# Patient Record
Sex: FEMALE | Race: ASIAN | NOT HISPANIC OR LATINO
[De-identification: names, ages, dates, MRNs, and addresses within clinical notes are randomized per-mention and may not be internally consistent; named-entity substitution may affect disease eponyms.]

---

## 2017-01-31 ENCOUNTER — APPOINTMENT (OUTPATIENT)
Dept: UROGYNECOLOGY | Facility: CLINIC | Age: 72
End: 2017-01-31

## 2017-02-01 RX ORDER — SOLIFENACIN SUCCINATE 5 MG/1
5 TABLET, FILM COATED ORAL
Refills: 0 | Status: ACTIVE | COMMUNITY
Start: 2017-02-01

## 2017-04-10 ENCOUNTER — RESULT REVIEW (OUTPATIENT)
Age: 72
End: 2017-04-10

## 2017-11-21 ENCOUNTER — APPOINTMENT (OUTPATIENT)
Dept: UROGYNECOLOGY | Facility: CLINIC | Age: 72
End: 2017-11-21
Payer: MEDICARE

## 2017-11-21 PROCEDURE — 99213 OFFICE O/P EST LOW 20 MIN: CPT

## 2018-09-10 ENCOUNTER — APPOINTMENT (OUTPATIENT)
Dept: UROGYNECOLOGY | Facility: CLINIC | Age: 73
End: 2018-09-10
Payer: MEDICARE

## 2018-09-10 DIAGNOSIS — N95.2 POSTMENOPAUSAL ATROPHIC VAGINITIS: ICD-10-CM

## 2018-09-10 PROCEDURE — A4562: CPT

## 2018-09-10 PROCEDURE — 99213 OFFICE O/P EST LOW 20 MIN: CPT

## 2020-02-04 PROBLEM — M54.9 BACK PAIN: Status: ACTIVE | Noted: 2020-02-04

## 2020-02-05 ENCOUNTER — APPOINTMENT (OUTPATIENT)
Dept: GYNECOLOGIC ONCOLOGY | Facility: CLINIC | Age: 75
End: 2020-02-05
Payer: MEDICARE

## 2020-02-05 VITALS
DIASTOLIC BLOOD PRESSURE: 86 MMHG | WEIGHT: 123 LBS | SYSTOLIC BLOOD PRESSURE: 136 MMHG | HEIGHT: 60 IN | HEART RATE: 77 BPM | BODY MASS INDEX: 24.15 KG/M2

## 2020-02-05 DIAGNOSIS — M54.9 DORSALGIA, UNSPECIFIED: ICD-10-CM

## 2020-02-05 PROCEDURE — 99205 OFFICE O/P NEW HI 60 MIN: CPT | Mod: 25

## 2020-02-05 PROCEDURE — 58100 BIOPSY OF UTERUS LINING: CPT

## 2020-02-13 LAB — CORE LAB BIOPSY: NORMAL

## 2020-03-03 ENCOUNTER — APPOINTMENT (OUTPATIENT)
Dept: UROGYNECOLOGY | Facility: CLINIC | Age: 75
End: 2020-03-03
Payer: MEDICARE

## 2020-03-03 VITALS
DIASTOLIC BLOOD PRESSURE: 68 MMHG | WEIGHT: 124 LBS | SYSTOLIC BLOOD PRESSURE: 104 MMHG | HEIGHT: 61 IN | BODY MASS INDEX: 23.41 KG/M2

## 2020-03-03 LAB
BILIRUB UR QL STRIP: NORMAL
CLARITY UR: CLEAR
COLLECTION METHOD: NORMAL
GLUCOSE UR-MCNC: NORMAL
HCG UR QL: 0.2 EU/DL
HGB UR QL STRIP.AUTO: NORMAL
KETONES UR-MCNC: NORMAL
LEUKOCYTE ESTERASE UR QL STRIP: NORMAL
NITRITE UR QL STRIP: NORMAL
PH UR STRIP: 6.5
PROT UR STRIP-MCNC: NORMAL
SP GR UR STRIP: 1

## 2020-03-03 PROCEDURE — 99215 OFFICE O/P EST HI 40 MIN: CPT | Mod: 25

## 2020-03-03 PROCEDURE — 81003 URINALYSIS AUTO W/O SCOPE: CPT | Mod: QW

## 2020-03-03 PROCEDURE — 51701 INSERT BLADDER CATHETER: CPT

## 2020-03-03 NOTE — LETTER BODY
[Dear  ___] : Dear ~ROBI, [Dear  ___] : Dear  [unfilled], [I had the pleasure of evaluating your patient, [unfilled]. Thank you for referring Ms. [unfilled] for consultation for ___] : I had the pleasure of evaluating your patient, [unfilled]. Thank you for referring Ms. [unfilled] for consultation for [unfilled]. [Thank you very much for allowing me to participate in the care of this patient. If you have any questions, please do not hesitate to contact me] : Thank you very much for allowing me to participate in the care of this patient. If you have any questions, please do not hesitate to contact me. [Attached please find my note.] : Attached please find my note.

## 2020-03-03 NOTE — DISCUSSION/SUMMARY
[FreeTextEntry1] : I reviewed the above findings with the patient and her . We reviewed surgical and nonsurgical treatment options for the prolapse and she is interested in surgical correction. We reviewed that surgery is not intended to treat her urinary complaints consistent with overactive bladder. We reviewed the abdominal birth the vaginal route for pelvic organ prolapse and that we will further discuss this after she leaves the pessary out for 10-14 days in order for me to assess the full extent of her prolapse. We discussed returning for urodynamic testing to further evaluate her urinary complaints. Multiple questions regarding treatment options were discussed. IUGA. Patient information on pelvic organ prolapse, stress incontinence, an overactive bladder was given to her. All questions were answered.

## 2020-03-03 NOTE — PROCEDURE
[FreeTextEntry1] : A catheterized urine was performed to rule out urinary tract infection and/or retention.\par

## 2020-03-03 NOTE — PHYSICAL EXAM
[Well developed] : well developed [No Acute Distress] : in no acute distress [Well Nourished] : ~L well nourished [Oriented x3] : oriented to person, place, and time [Soft, Nontender] : the abdomen was soft and nontender [Warm and Dry] : was warm and dry to touch [Vulvar Atrophy] : vulvar atrophy [Atrophy] : atrophy [Normal Appearance] : general appearance was normal [Normal] : normal [Uterine Prolapse] : uterine prolapse [Uterine Adnexae] : were not tender and not enlarged [Post Void Residual ____ml] : post void residual via catheterization was [unfilled] ml [Anxiety] : patient is not anxious [FreeTextEntry4] : ring pessary in place and removed [de-identified] : unable to fully asses due to pessary

## 2020-03-03 NOTE — HISTORY OF PRESENT ILLNESS
[Unable To Restrain Bowel Movement] : rare [] : months ago [Urinary Frequency] : none [Feelings Of Urinary Urgency] : daily [Urinary Tract Infection] : none [x2] : two times a night [de-identified] : pt has pessary for the past 5 years - self care every 8-10 days [de-identified] : not sexually active.  [FreeTextEntry1] : pt here with , chart reviewed. with a history of pelvic organ prolapse had an endometrial biopsy in February, which revealed atrophic endometrial tissue. She had a MRI in August, which revealed uterus 4.5 x 2.4 x 4.5 cm and an endometrial lining of 5 mm.\par pt has had a pessary for the past 5 years. pt interested in surgical correction for POP

## 2020-03-09 ENCOUNTER — APPOINTMENT (OUTPATIENT)
Dept: UROGYNECOLOGY | Facility: CLINIC | Age: 75
End: 2020-03-09

## 2020-03-17 ENCOUNTER — APPOINTMENT (OUTPATIENT)
Dept: UROGYNECOLOGY | Facility: CLINIC | Age: 75
End: 2020-03-17

## 2020-03-23 ENCOUNTER — APPOINTMENT (OUTPATIENT)
Dept: UROGYNECOLOGY | Facility: CLINIC | Age: 75
End: 2020-03-23

## 2020-04-27 ENCOUNTER — APPOINTMENT (OUTPATIENT)
Dept: UROGYNECOLOGY | Facility: CLINIC | Age: 75
End: 2020-04-27
Payer: MEDICARE

## 2020-04-27 DIAGNOSIS — N81.9 FEMALE GENITAL PROLAPSE, UNSPECIFIED: ICD-10-CM

## 2020-04-27 DIAGNOSIS — S37.32XA: ICD-10-CM

## 2020-04-27 PROCEDURE — 99214 OFFICE O/P EST MOD 30 MIN: CPT

## 2020-04-27 NOTE — PHYSICAL EXAM
[Vulvar Atrophy] : vulvar atrophy [No Acute Distress] : in no acute distress [Cystocele] : a cystocele [Uterine Prolapse] : uterine prolapse [Normal Appearance] : general appearance was normal [Aa ____] : Aa [unfilled] [Ba ____] : Ba [unfilled] [C ____] : C [unfilled] [Ap ____] : Ap [unfilled] [TVL ____] : TVL  [unfilled] [GH ____] : GH [unfilled] [Bp ____] : Bp [unfilled] [D ____] : D [unfilled] [Normal] : normal [Uterine Adnexae] : were not tender and not enlarged [Soft, Nontender] : the abdomen was soft and nontender [Warm and Dry] : was warm and dry to touch [FreeTextEntry3] : + caruncle 1x2 cm with hematoma

## 2020-04-27 NOTE — DISCUSSION/SUMMARY
[FreeTextEntry1] : I reviewed the above findings with the patient.  We discussed the urethral caruncle with likely hematoma -for such she will estrogen cream nightly to caruncle and BOW intravaginally and return in 2 weeks.  For her prolapse she is interested in surgical correction and rturn for urodynamic testing in about a month.  She will put here pessary back in about 10-14days.\par \par All questions answered.

## 2020-04-27 NOTE — HISTORY OF PRESENT ILLNESS
[Uterine Prolapse] : no [FreeTextEntry4] : see below [FreeTextEntry5] : feels dialy since pessary out [de-identified] : sometimes with bleeding and watery discharge  [FreeTextEntry1] :  prescribed Cipro on April 12th for diarrhea which has resolved\par had some vaginal bleeding on April 18th\par pt looked with a mirror in the vagina and saw some redness, pt took Diflucan and used topical antifungal\par pessary out 6 days ago, sees a vaginal swelling and some vaginal staining- pt had endom bx in feb/2020\par ROS otherwise negative\par \par

## 2020-05-11 ENCOUNTER — APPOINTMENT (OUTPATIENT)
Dept: UROGYNECOLOGY | Facility: CLINIC | Age: 75
End: 2020-05-11
Payer: MEDICARE

## 2020-05-11 VITALS — TEMPERATURE: 98.2 F

## 2020-05-11 DIAGNOSIS — N36.2 URETHRAL CARUNCLE: ICD-10-CM

## 2020-05-11 PROCEDURE — 99213 OFFICE O/P EST LOW 20 MIN: CPT

## 2020-05-11 NOTE — PHYSICAL EXAM
[No Acute Distress] : in no acute distress [Soft, Nontender] : the abdomen was soft and nontender [Warm and Dry] : was warm and dry to touch [Vulvar Atrophy] : vulvar atrophy [Normal Appearance] : general appearance was normal [Cystocele] : a cystocele [Uterine Prolapse] : uterine prolapse [Uterine Adnexae] : were not tender and not enlarged [Oriented x3] : oriented to person, place, and time [Normal] : the sensory exam was normal [Normal Gait] : gait was normal [Labia Majora] : were normal [Labia Minora] : were normal [Anxiety] : patient is not anxious [Tenderness] : ~T no ~M abdominal tenderness observed [Mass (___ Cm)] : no ~M [unfilled] abdominal mass was palpated [Inguinal LAD] : no adenopathy was noted in the inguinal lymph nodes [Distended] : not distended [FreeTextEntry3] : + caruncle 1x1, much improved, hematoma completely resolved.  caruncle reducible on exam

## 2020-05-11 NOTE — ASSESSMENT
[FreeTextEntry1] : 76 yo F with stage II POP cystocele and rectocele presents for follow up for prolapse and urethral caruncle with hematoma.  Caruncle much improved on exam, patient to continue nightly application of vaginal estrogen to caruncle and will apply estrogen to vagina for atrophy biweekly.\par \par To return for urodynamic testing in 4 weeks  and follow up for repeat exam for assessment of caruncle in 6 weeks.  All questions answered.

## 2020-05-11 NOTE — HISTORY OF PRESENT ILLNESS
[Uterine Prolapse] : no [Vaginal Wall Prolapse] : no [Rectal Prolapse] : mild [FreeTextEntry5] : improved after replacing pessary [FreeTextEntry4] : resolved [FreeTextEntry1] : 76 yo F with stage II POP cystocele and rectocele presents for follow up for prolapse and urethral caruncle.  Patient had been using RS#3 for her prolapse.  At last visit on 3/03/20 pessary had fallen out and patient was having vaginal irritation and spotting likely secondary to the urethral caruncle with small hematoma noted on physical exam.  Plan was for follow up visit to reexamine caruncle, and for patient to reinsert pessary.  Of note EMB 2/2020 was negative.  Since last visit patient has been using vaginal estrogen to caruncle with improvement in irritation.\par \par

## 2020-06-03 ENCOUNTER — APPOINTMENT (OUTPATIENT)
Dept: UROGYNECOLOGY | Facility: CLINIC | Age: 75
End: 2020-06-03

## 2020-06-22 ENCOUNTER — APPOINTMENT (OUTPATIENT)
Dept: UROGYNECOLOGY | Facility: CLINIC | Age: 75
End: 2020-06-22
Payer: MEDICARE

## 2020-06-22 ENCOUNTER — OUTPATIENT (OUTPATIENT)
Dept: OUTPATIENT SERVICES | Facility: HOSPITAL | Age: 75
LOS: 1 days | End: 2020-06-22
Payer: MEDICARE

## 2020-06-22 DIAGNOSIS — Z01.818 ENCOUNTER FOR OTHER PREPROCEDURAL EXAMINATION: ICD-10-CM

## 2020-06-22 DIAGNOSIS — N39.46 MIXED INCONTINENCE: ICD-10-CM

## 2020-06-22 DIAGNOSIS — Z98.89 OTHER SPECIFIED POSTPROCEDURAL STATES: Chronic | ICD-10-CM

## 2020-06-22 LAB
BILIRUB UR QL STRIP: NORMAL
CLARITY UR: CLEAR
COLLECTION METHOD: NORMAL
GLUCOSE UR-MCNC: NORMAL
HCG UR QL: 0.2 EU/DL
HGB UR QL STRIP.AUTO: NORMAL
KETONES UR-MCNC: NORMAL
LEUKOCYTE ESTERASE UR QL STRIP: NORMAL
NITRITE UR QL STRIP: NORMAL
PH UR STRIP: 5.5
PROT UR STRIP-MCNC: NORMAL
SP GR UR STRIP: <1.005

## 2020-06-22 PROCEDURE — 51797 INTRAABDOMINAL PRESSURE TEST: CPT | Mod: 26

## 2020-06-22 PROCEDURE — 51729 CYSTOMETROGRAM W/VP&UP: CPT

## 2020-06-22 PROCEDURE — 51784 ANAL/URINARY MUSCLE STUDY: CPT | Mod: 26

## 2020-06-22 PROCEDURE — 51797 INTRAABDOMINAL PRESSURE TEST: CPT

## 2020-06-22 PROCEDURE — 51729 CYSTOMETROGRAM W/VP&UP: CPT | Mod: 26

## 2020-06-22 PROCEDURE — 51784 ANAL/URINARY MUSCLE STUDY: CPT

## 2020-06-29 ENCOUNTER — APPOINTMENT (OUTPATIENT)
Dept: UROGYNECOLOGY | Facility: CLINIC | Age: 75
End: 2020-06-29
Payer: MEDICARE

## 2020-06-29 PROCEDURE — 99215 OFFICE O/P EST HI 40 MIN: CPT

## 2020-06-29 NOTE — HISTORY OF PRESENT ILLNESS
[FreeTextEntry1] : Patient returns today for followup on her pelvic organ prolapse and urinary incontinence. She called her daughter, Shira, who was on the telephone while we had the discussion.Her chart was reviewed. She currently has a pessary in which she does to self-care 4. She is using vaginal estrogen vaginally and to her urethral caruncle.on exam in April. She had a popq. stage II prolapse with uterovaginal prolapse and cystocele.She underwent urodynamic testing in June, which revealed Stress urinary incontinence and detrusor instability. I reviewed these findings with her. I reviewed the above findings with the patient with visual illustrations. Treatment options for the prolapse were discussed and included doing nothing, Kegel exercises and behavioral modification, a pessary, or surgical correction.Surgically we discussed the abdominal vs the vaginal routes.  Vaginally we discussed a vaginal hysterectomy, uterosacral suspension, and anterior/posterior repair. We discussed the stress incontinence, as well as surgical and nonsurgical treatment options, and she chose surgery for the prolapse. We can do a mid urethral sling. Concomitantly. We discussed that surgery is not intended to treat the detrusor instability and this may persist or worsen postoperatively. Multiple questions regarding the surgical procedure, success, complications, and postoperative restrictions were discussed. At this point in time, she would like to continue with the pessary and will consider surgery for some time in the spring of 20, 21. She is very concerned at this point in time due to COVID. She also would like me to look at her urethral caruncle today to see if it has improved or resolved.\par

## 2020-06-29 NOTE — DISCUSSION/SUMMARY
[FreeTextEntry1] : We discussed decreasing the vaginal estrogen to the urethral meatus to twice a week, when she uses it intravaginally. Patient will followup in the office in 6 months or earlier as necessary. All questions were answered.

## 2020-06-29 NOTE — PHYSICAL EXAM
[No Acute Distress] : in no acute distress [Well developed] : well developed [Well Nourished] : ~L well nourished [Normal Mood/Affect] : mood and affect are normal [Warm and Dry] : was warm and dry to touch [Respirations regular] : ~T respiratory rate was regular [Normal] : normal external genitalia [Anxiety] : patient is not anxious [FreeTextEntry3] : Caruncle resolved

## 2021-03-10 ENCOUNTER — APPOINTMENT (OUTPATIENT)
Dept: UROGYNECOLOGY | Facility: CLINIC | Age: 76
End: 2021-03-10
Payer: MEDICARE

## 2021-03-10 VITALS
DIASTOLIC BLOOD PRESSURE: 84 MMHG | HEIGHT: 61 IN | WEIGHT: 122 LBS | HEART RATE: 86 BPM | BODY MASS INDEX: 23.03 KG/M2 | TEMPERATURE: 96.3 F | SYSTOLIC BLOOD PRESSURE: 143 MMHG

## 2021-03-10 DIAGNOSIS — N95.0 POSTMENOPAUSAL BLEEDING: ICD-10-CM

## 2021-03-10 PROCEDURE — A4562: CPT

## 2021-03-10 PROCEDURE — 99214 OFFICE O/P EST MOD 30 MIN: CPT

## 2021-03-10 PROCEDURE — 99072 ADDL SUPL MATRL&STAF TM PHE: CPT

## 2021-03-10 NOTE — PHYSICAL EXAM
[Chaperone Present] : A chaperone was present in the examining room during all aspects of the physical examination [No Acute Distress] : in no acute distress [Well developed] : well developed [Well Nourished] : ~L well nourished [Oriented x3] : oriented to person, place, and time [Soft, Nontender] : the abdomen was soft and nontender [Warm and Dry] : was warm and dry to touch [Vulvar Atrophy] : vulvar atrophy [Normal Appearance] : general appearance was normal [Atrophy] : atrophy [Uterine Prolapse] : uterine prolapse [Normal] : normal [de-identified] : Unable to fully assess due to pessary [de-identified] : Positive erythema, likely from pessary

## 2021-03-10 NOTE — DISCUSSION/SUMMARY
[FreeTextEntry1] : pt's daughter callled and conferenced in  for discussion. Questions regarding pessary and surgery were answered in the exam room. Additional questions were answered today. We discussed that the urinary urgency is likely from her detrusor instability. We discussed going for a pelvic ultrasoundto rule out thickening of the endometrium. She had some slight thickening in the past and had a biopsy in February. We discussed postoperative care and restrictions. She elected to continue with the pessary. She will continue to do self pessary  care and l followup in the office in approximately 6 months. All questions were answered

## 2021-03-10 NOTE — HISTORY OF PRESENT ILLNESS
[Urinary Frequency] : no [FreeTextEntry3] : uses pessary [FreeTextEntry4] : slight pinkish on tissue for the last 9 mos [FreeTextEntry5] : has pessary [de-identified] : slight [de-identified] : rare [de-identified] : no change fro previous - every 1-2-3 hours.  [de-identified] : 1-2 [FreeTextEntry1] : pt here for f/u on prolapse \par has increased urinary urgency \par has urethral caruncle - using vaginal estrogen\par pt has a pessary  -  does self care - pessary has an odor, and discolored.\par

## 2021-04-16 ENCOUNTER — RESULT REVIEW (OUTPATIENT)
Age: 76
End: 2021-04-16

## 2021-04-16 ENCOUNTER — APPOINTMENT (OUTPATIENT)
Dept: ULTRASOUND IMAGING | Facility: IMAGING CENTER | Age: 76
End: 2021-04-16
Payer: MEDICARE

## 2021-04-16 ENCOUNTER — OUTPATIENT (OUTPATIENT)
Dept: OUTPATIENT SERVICES | Facility: HOSPITAL | Age: 76
LOS: 1 days | End: 2021-04-16
Payer: MEDICARE

## 2021-04-16 DIAGNOSIS — Z98.89 OTHER SPECIFIED POSTPROCEDURAL STATES: Chronic | ICD-10-CM

## 2021-04-16 DIAGNOSIS — N81.2 INCOMPLETE UTEROVAGINAL PROLAPSE: ICD-10-CM

## 2021-04-16 PROCEDURE — 76856 US EXAM PELVIC COMPLETE: CPT | Mod: 26,59

## 2021-04-16 PROCEDURE — 76830 TRANSVAGINAL US NON-OB: CPT

## 2021-04-16 PROCEDURE — 76856 US EXAM PELVIC COMPLETE: CPT

## 2021-04-16 PROCEDURE — 76830 TRANSVAGINAL US NON-OB: CPT | Mod: 26

## 2021-04-20 ENCOUNTER — NON-APPOINTMENT (OUTPATIENT)
Age: 76
End: 2021-04-20

## 2021-04-30 ENCOUNTER — NON-APPOINTMENT (OUTPATIENT)
Age: 76
End: 2021-04-30

## 2021-05-05 ENCOUNTER — APPOINTMENT (OUTPATIENT)
Dept: GYNECOLOGIC ONCOLOGY | Facility: CLINIC | Age: 76
End: 2021-05-05
Payer: MEDICARE

## 2021-05-05 VITALS
HEART RATE: 82 BPM | WEIGHT: 121.13 LBS | BODY MASS INDEX: 22.87 KG/M2 | DIASTOLIC BLOOD PRESSURE: 76 MMHG | HEIGHT: 61 IN | SYSTOLIC BLOOD PRESSURE: 113 MMHG

## 2021-05-05 DIAGNOSIS — R93.89 ABNORMAL FINDINGS ON DIAGNOSTIC IMAGING OF OTHER SPECIFIED BODY STRUCTURES: ICD-10-CM

## 2021-05-05 DIAGNOSIS — Z09 ENCOUNTER FOR FOLLOW-UP EXAMINATION AFTER COMPLETED TREATMENT FOR CONDITIONS OTHER THAN MALIGNANT NEOPLASM: ICD-10-CM

## 2021-05-05 PROCEDURE — 99214 OFFICE O/P EST MOD 30 MIN: CPT

## 2021-05-05 PROCEDURE — 99072 ADDL SUPL MATRL&STAF TM PHE: CPT

## 2021-05-18 ENCOUNTER — NON-APPOINTMENT (OUTPATIENT)
Age: 76
End: 2021-05-18

## 2021-05-19 ENCOUNTER — NON-APPOINTMENT (OUTPATIENT)
Age: 76
End: 2021-05-19

## 2021-05-20 ENCOUNTER — NON-APPOINTMENT (OUTPATIENT)
Age: 76
End: 2021-05-20

## 2021-05-20 LAB — CORE LAB BIOPSY: NORMAL

## 2021-05-24 ENCOUNTER — APPOINTMENT (OUTPATIENT)
Dept: UROGYNECOLOGY | Facility: CLINIC | Age: 76
End: 2021-05-24
Payer: MEDICARE

## 2021-05-24 VITALS — BODY MASS INDEX: 22.66 KG/M2 | TEMPERATURE: 97.6 F | HEIGHT: 61 IN | WEIGHT: 120 LBS

## 2021-05-24 VITALS — SYSTOLIC BLOOD PRESSURE: 137 MMHG | DIASTOLIC BLOOD PRESSURE: 87 MMHG

## 2021-05-24 DIAGNOSIS — N32.81 OVERACTIVE BLADDER: ICD-10-CM

## 2021-05-24 PROCEDURE — 99072 ADDL SUPL MATRL&STAF TM PHE: CPT

## 2021-05-24 PROCEDURE — 99214 OFFICE O/P EST MOD 30 MIN: CPT

## 2021-05-24 NOTE — HISTORY OF PRESENT ILLNESS
[FreeTextEntry1] : Patient returns today with her  for followup of her pelvic organ prolapse and urinary incontinence. Her chart was reviewed. Her daughter Shira was called via her cell phone. The patient has a history of a uterovaginal prolapse, cystocele and rectocele. She's had urodynamic testing in the past, which has revealed detrusor instability and stress incontinence. She's had a pelvic ultrasound, which showed a uterus 4.1 x 2.5 x 2.7 cm endometrial lining was 7 mm, and she had an endometrial biopsy, which was benign. We reviewed these findings. Reviewed surgical and nonsurgical treatment options for her pelvic organ and she wishes to proceed with surgical correction via vaginal route. We discussed a vaginal hysterectomy, uterosacral suspension, anterior repair, and possible posterior repair. We discussed the stress incontinence and proceed with a mid urethral sling for such. We discussed that surgery is not intended to treat the detrusor instability and this may persist or worsen postoperatively. We discussed the possibility of going home with a catheter. Multiple questions were answered and she wishes to schedule surgery

## 2021-06-08 ENCOUNTER — NON-APPOINTMENT (OUTPATIENT)
Age: 76
End: 2021-06-08

## 2021-07-06 ENCOUNTER — APPOINTMENT (OUTPATIENT)
Dept: UROGYNECOLOGY | Facility: CLINIC | Age: 76
End: 2021-07-06
Payer: MEDICARE

## 2021-07-06 VITALS — HEART RATE: 87 BPM | DIASTOLIC BLOOD PRESSURE: 82 MMHG | TEMPERATURE: 97.1 F | SYSTOLIC BLOOD PRESSURE: 130 MMHG

## 2021-07-06 PROCEDURE — 99214 OFFICE O/P EST MOD 30 MIN: CPT

## 2021-07-06 PROCEDURE — 99072 ADDL SUPL MATRL&STAF TM PHE: CPT

## 2021-07-06 NOTE — HISTORY OF PRESENT ILLNESS
[FreeTextEntry1] : \par Meme is a 75yo with Stage 2 uterovaginal prolapse, cystocele, rectocele, and mixed incontinence. We reviewed her management options, and she continues to desire surgical intervention. She previously tried a pessary but did not like it. She has a h/o of a thickened endometrium and was evaluated by Dr Rao, who recommended frozen section at time of surgery. She denies personal/family history of breast or gyn malignancies. \par \par Preop Eval: \par UDS: +FATEMEH at 200-250ml, no ISD, +DO without leak, PVR 0\par TVUS: 4.1x2.5x2.7cm, ELGIN 7mm\par Pap: NILM (7/16/19)\par EMB: Benign/atrophic endometrial tissue and endocervical glands\par \par PMH: IBS, HTN, HLD, GERD\par PSH: Breast biopsy (benign), D+C

## 2021-07-06 NOTE — DISCUSSION/SUMMARY
[FreeTextEntry1] : \par Meme presents with Stage 2 uterovaginal prolapse, cystocele, rectocele. We reviewed both nonsurgical and surgical options and she continues to desire surgical management. She has been counseled regarding options for reconstructive surgery. This includes both abdominal, laparoscopic, robotic,and vaginal options. She has elected for the vaginal approach to surgery. Based on our discussion she will have a vaginal hysterectomy, uterosacral ligament suspension and anterior and posterior repair. The options for repairs with and without the use of mesh and biological materials were reviewed. Based on our discussion she would like to go with a native tissue repair. For her stress incontinence, she desires a midurethral sling with mesh. We reviewed that the sling is not intended to treat frequency, urgency, or urgency incontinence. She expressed understanding that mesh will be used for the antiincontinence procedure. Risks and benefits of a TOT sling versus a Retropubic sling vs mini-sling were discussed and included but not limited to bladder and bowel perforation, voiding dysfunction, and groin pain. She wishes to proceed with a single incision mini sling. We discussed success rates, failure rates, and possible risks. The risks discussed include, but are not limited to: changes to the vaginal anatomy, vaginal pain, bleeding, pelvic pain, dyspareunia, need for revision, vaginal discharge, urinary retention, development or worsening of stress urinary incontinence or urge incontinence, infection, failure of the procedure, neuropathy. We also extensively reviewed the risk of injury to the bowel, rectum, bladder, ureters, urethra, blood vessels, and nerves. We discussed the risk of sling mesh erosion and need for sling revision. We discussed the risk of fistula formation and possible conversion to laparotomy or laparoscopy. We also discussed possible change in bowel habits, with improvement or worsening of constipation. We discussed the possibility of removing the ovaries/fallopian tubes and we indicated the ovaries would be removed only if they appeared grossly abnormal, otherwise they would remain in-situ. She understands that retaining the ovaries does maintain a risk of ovarian cancer. All risks were explained in layman's terms. We reviewed the preoperative and postoperative instructions as well as hospital stay.\par \par Consent was signed in the office for transvaginal hysterectomy, uterosacral ligament suspension, anterior posterior enterocele repair, single incision midurethral sling, cystoscopy, possible bilateral salpingo-oophorectomy, laparoscopy, laparotomy. We will plan to send the specimen for frozen section.

## 2021-07-06 NOTE — PHYSICAL EXAM
[Chaperone Present] : A chaperone was present in the examining room during all aspects of the physical examination [Well developed] : well developed [No Acute Distress] : in no acute distress [Well Nourished] : ~L well nourished [Soft, Nontender] : the abdomen was soft and nontender [Warm and Dry] : was warm and dry to touch [Normal Gait] : gait was normal [Labia Majora] : were normal [Labia Minora] : were normal [Normal Appearance] : general appearance was normal [Cystocele] : a cystocele [Uterine Prolapse] : uterine prolapse [No Bleeding] : there was no active vaginal bleeding [Aa ____] : Aa [unfilled] [Ba ____] : Ba [unfilled] [C ____] : C [unfilled] [GH ____] : GH [unfilled] [TVL ____] : TVL  [unfilled] [Ap ____] : Ap [unfilled] [Bp ____] : Bp [unfilled] [D ____] : D [unfilled] [Uterine Adnexae] : were not tender and not enlarged [Normal] : no abnormalities [PB ____] : PB [unfilled] [Tenderness] : ~T no ~M abdominal tenderness observed [Distended] : not distended

## 2021-07-08 LAB — BACTERIA UR CULT: NORMAL

## 2021-07-15 ENCOUNTER — OUTPATIENT (OUTPATIENT)
Dept: OUTPATIENT SERVICES | Facility: HOSPITAL | Age: 76
LOS: 1 days | End: 2021-07-15
Payer: MEDICARE

## 2021-07-15 VITALS
HEART RATE: 70 BPM | RESPIRATION RATE: 18 BRPM | HEIGHT: 59 IN | TEMPERATURE: 99 F | WEIGHT: 119.93 LBS | OXYGEN SATURATION: 97 % | DIASTOLIC BLOOD PRESSURE: 73 MMHG | SYSTOLIC BLOOD PRESSURE: 105 MMHG

## 2021-07-15 DIAGNOSIS — Z29.9 ENCOUNTER FOR PROPHYLACTIC MEASURES, UNSPECIFIED: ICD-10-CM

## 2021-07-15 DIAGNOSIS — Z01.818 ENCOUNTER FOR OTHER PREPROCEDURAL EXAMINATION: ICD-10-CM

## 2021-07-15 DIAGNOSIS — Z98.890 OTHER SPECIFIED POSTPROCEDURAL STATES: Chronic | ICD-10-CM

## 2021-07-15 DIAGNOSIS — N81.2 INCOMPLETE UTEROVAGINAL PROLAPSE: ICD-10-CM

## 2021-07-15 DIAGNOSIS — Z98.89 OTHER SPECIFIED POSTPROCEDURAL STATES: Chronic | ICD-10-CM

## 2021-07-15 LAB
A1C WITH ESTIMATED AVERAGE GLUCOSE RESULT: 5.6 % — SIGNIFICANT CHANGE UP (ref 4–5.6)
ANION GAP SERPL CALC-SCNC: 11 MMOL/L — SIGNIFICANT CHANGE UP (ref 5–17)
BLD GP AB SCN SERPL QL: NEGATIVE — SIGNIFICANT CHANGE UP
BUN SERPL-MCNC: 10 MG/DL — SIGNIFICANT CHANGE UP (ref 7–23)
CALCIUM SERPL-MCNC: 9.9 MG/DL — SIGNIFICANT CHANGE UP (ref 8.4–10.5)
CHLORIDE SERPL-SCNC: 103 MMOL/L — SIGNIFICANT CHANGE UP (ref 96–108)
CO2 SERPL-SCNC: 23 MMOL/L — SIGNIFICANT CHANGE UP (ref 22–31)
CREAT SERPL-MCNC: 0.72 MG/DL — SIGNIFICANT CHANGE UP (ref 0.5–1.3)
ESTIMATED AVERAGE GLUCOSE: 114 MG/DL — SIGNIFICANT CHANGE UP (ref 68–114)
GLUCOSE SERPL-MCNC: 93 MG/DL — SIGNIFICANT CHANGE UP (ref 70–99)
HCT VFR BLD CALC: 37.4 % — SIGNIFICANT CHANGE UP (ref 34.5–45)
HGB BLD-MCNC: 12.2 G/DL — SIGNIFICANT CHANGE UP (ref 11.5–15.5)
MCHC RBC-ENTMCNC: 29.7 PG — SIGNIFICANT CHANGE UP (ref 27–34)
MCHC RBC-ENTMCNC: 32.6 GM/DL — SIGNIFICANT CHANGE UP (ref 32–36)
MCV RBC AUTO: 91 FL — SIGNIFICANT CHANGE UP (ref 80–100)
NRBC # BLD: 0 /100 WBCS — SIGNIFICANT CHANGE UP (ref 0–0)
PLATELET # BLD AUTO: 226 K/UL — SIGNIFICANT CHANGE UP (ref 150–400)
POTASSIUM SERPL-MCNC: 3.7 MMOL/L — SIGNIFICANT CHANGE UP (ref 3.5–5.3)
POTASSIUM SERPL-SCNC: 3.7 MMOL/L — SIGNIFICANT CHANGE UP (ref 3.5–5.3)
RBC # BLD: 4.11 M/UL — SIGNIFICANT CHANGE UP (ref 3.8–5.2)
RBC # FLD: 12.6 % — SIGNIFICANT CHANGE UP (ref 10.3–14.5)
RH IG SCN BLD-IMP: POSITIVE — SIGNIFICANT CHANGE UP
SODIUM SERPL-SCNC: 137 MMOL/L — SIGNIFICANT CHANGE UP (ref 135–145)
WBC # BLD: 4.28 K/UL — SIGNIFICANT CHANGE UP (ref 3.8–10.5)
WBC # FLD AUTO: 4.28 K/UL — SIGNIFICANT CHANGE UP (ref 3.8–10.5)

## 2021-07-15 PROCEDURE — 86850 RBC ANTIBODY SCREEN: CPT

## 2021-07-15 PROCEDURE — 83036 HEMOGLOBIN GLYCOSYLATED A1C: CPT

## 2021-07-15 PROCEDURE — G0463: CPT

## 2021-07-15 PROCEDURE — 85027 COMPLETE CBC AUTOMATED: CPT

## 2021-07-15 PROCEDURE — 80048 BASIC METABOLIC PNL TOTAL CA: CPT

## 2021-07-15 PROCEDURE — 86900 BLOOD TYPING SEROLOGIC ABO: CPT

## 2021-07-15 PROCEDURE — 86901 BLOOD TYPING SEROLOGIC RH(D): CPT

## 2021-07-15 NOTE — H&P PST ADULT - NSICDXPASTSURGICALHX_GEN_ALL_CORE_FT
PAST SURGICAL HISTORY:  History of lumpectomy 2015    Normal vaginal delivery     S/P D&C (status post dilation and curettage) 3/2/15

## 2021-07-15 NOTE — H&P PST ADULT - NSICDXPASTMEDICALHX_GEN_ALL_CORE_FT
PAST MEDICAL HISTORY:  Breast mass     Cervical disc disease     HLD (hyperlipidemia)     HTN (hypertension)     Hypothyroid     Prolapse of uterus     Urge and stress incontinence

## 2021-07-15 NOTE — H&P PST ADULT - HISTORY OF PRESENT ILLNESS
76 yr old female with PMH of GERD, HTN, Hypothyroidism , HLD . Pt has been experiencing  incontinence of urine /pelvic organ prolapse followed by GYN . She previously tried a pessary but did not like it. She has a h/o of a thickened endometrium and was evaluated by Dr Rao. Presents to PST for scheduled Vaginal Hysterectomy Uterosacral suspension /Anterior Repair Sling, cystoscopy on 7/29/21       Covid vaccine card on file

## 2021-07-15 NOTE — H&P PST ADULT - NSICDXPROBLEM_GEN_ALL_CORE_FT
PROBLEM DIAGNOSES  Problem: Incomplete uterovaginal prolapse  Assessment and Plan: Vaginal hysterectomy ,uterosacral Suspension ,Anterior Repair Sling, Cystoscopy on 7/29/21  -Pre- Op Instructions discussd   -Labs sent ,  -covid vaccine card on file   - pt will stop ASA x 7 days  as per surgeon  -pt will take HTN meds     Problem: Need for prophylactic measure  Assessment and Plan: The Caprini score indicates this patient is at risk for a VTE event (score 3-5).  Most surgical patients in this group would benefit from pharmacologic prophylaxis.  The surgical team will determine the balance between VTE risk and bleeding risk

## 2021-07-15 NOTE — H&P PST ADULT - ASSESSMENT
CAPRINI SCORE [CLOT updated 18]    AGE RELATED RISK FACTORS                                                       MOBILITY RELATED FACTORS  [ ] Age 41-60 years                                            (1 Point)                    [ ] Bed rest                                                        (1 Point)  [ ] Age: 61-74 years                                           (2 Points)                  [ ] Plaster cast                                                   (2 Points)  [x ] Age= 75 years                                              (3 Points)                    [ ] Bed bound for more than 72 hours                 (2 Points)    DISEASE RELATED RISK FACTORS                                               GENDER SPECIFIC FACTORS  [ ] Edema in the lower extremities                       (1 Point)              [ ] Pregnancy                                                     (1 Point)  [ ] Varicose veins                                               (1 Point)                     [ ] Post-partum < 6 weeks                                   (1 Point)             [ ] BMI > 25 Kg/m2                                            (1 Point)                     [ ] Hormonal therapy  or oral contraception          (1 Point)                 [ ] Sepsis (in the previous month)                        (1 Point)               [ ] History of pregnancy complications                 (1 point)  [ ] Pneumonia or serious lung disease                                               [ ] Unexplained or recurrent                     (1 Point)           (in the previous month)                               (1 Point)  [ ] Abnormal pulmonary function test                     (1 Point)                 SURGERY RELATED RISK FACTORS  [ ] Acute myocardial infarction                              (1 Point)               [ ]  Section                                             (1 Point)  [ ] Congestive heart failure (in the previous month)  (1 Point)      [ ] Minor surgery                                                  (1 Point)   [ ] Inflammatory bowel disease                             (1 Point)               [ ] Arthroscopic surgery                                        (2 Points)  [ ] Central venous access                                      (2 Points)                [x ] General surgery lasting more than 45 minutes (2 points)  [ ] Present or previous malignancy                     (2 Points)                [ ] Elective arthroplasty                                         (5 points)    [ ] Stroke (in the previous month)                          (5 Points)                                                                                                                                                           HEMATOLOGY RELATED FACTORS                                                 TRAUMA RELATED RISK FACTORS  [ ] Prior episodes of VTE                                     (3 Points)                [ ] Fracture of the hip, pelvis, or leg                       (5 Points)  [ ] Positive family history for VTE                         (3 Points)             [ ] Acute spinal cord injury (in the previous month)  (5 Points)  [ ] Prothrombin 99699 A                                     (3 Points)               [ ] Paralysis  (less than 1 month)                             (5 Points)  [ ] Factor V Leiden                                             (3 Points)                  [ ] Multiple Trauma within 1 month                        (5 Points)  [ ] Lupus anticoagulants                                     (3 Points)                                                           [ ] Anticardiolipin antibodies                               (3 Points)                                                       [ ] High homocysteine in the blood                      (3 Points)                                             [ ] Other congenital or acquired thrombophilia      (3 Points)                                                [ ] Heparin induced thrombocytopenia                  (3 Points)                                     Total Score [      5    ]

## 2021-07-28 ENCOUNTER — TRANSCRIPTION ENCOUNTER (OUTPATIENT)
Age: 76
End: 2021-07-28

## 2021-07-29 ENCOUNTER — APPOINTMENT (OUTPATIENT)
Dept: UROGYNECOLOGY | Facility: HOSPITAL | Age: 76
End: 2021-07-29
Payer: MEDICARE

## 2021-07-29 ENCOUNTER — INPATIENT (INPATIENT)
Facility: HOSPITAL | Age: 76
LOS: 0 days | Discharge: HOME | DRG: 743 | End: 2021-07-30
Attending: UROLOGY | Admitting: UROLOGY
Payer: MEDICARE

## 2021-07-29 VITALS
DIASTOLIC BLOOD PRESSURE: 78 MMHG | OXYGEN SATURATION: 97 % | SYSTOLIC BLOOD PRESSURE: 126 MMHG | TEMPERATURE: 98 F | HEART RATE: 70 BPM | HEIGHT: 59 IN | RESPIRATION RATE: 18 BRPM | WEIGHT: 119.93 LBS

## 2021-07-29 DIAGNOSIS — Z98.89 OTHER SPECIFIED POSTPROCEDURAL STATES: Chronic | ICD-10-CM

## 2021-07-29 DIAGNOSIS — N81.11 CYSTOCELE, MIDLINE: ICD-10-CM

## 2021-07-29 DIAGNOSIS — N89.8 OTHER SPECIFIED NONINFLAMMATORY DISORDERS OF VAGINA: ICD-10-CM

## 2021-07-29 DIAGNOSIS — Z98.890 OTHER SPECIFIED POSTPROCEDURAL STATES: Chronic | ICD-10-CM

## 2021-07-29 DIAGNOSIS — N81.2 INCOMPLETE UTEROVAGINAL PROLAPSE: ICD-10-CM

## 2021-07-29 LAB
GLUCOSE BLDC GLUCOMTR-MCNC: 86 MG/DL — SIGNIFICANT CHANGE UP (ref 70–99)
RH IG SCN BLD-IMP: POSITIVE — SIGNIFICANT CHANGE UP

## 2021-07-29 PROCEDURE — 58260 VAGINAL HYSTERECTOMY: CPT

## 2021-07-29 PROCEDURE — 57265 CMBN AP COLPRHY W/NTRCL RPR: CPT

## 2021-07-29 PROCEDURE — 57288 REPAIR BLADDER DEFECT: CPT

## 2021-07-29 PROCEDURE — 88331 PATH CONSLTJ SURG 1 BLK 1SPC: CPT | Mod: 26

## 2021-07-29 PROCEDURE — 88307 TISSUE EXAM BY PATHOLOGIST: CPT | Mod: 26

## 2021-07-29 PROCEDURE — 57283 COLPOPEXY INTRAPERITONEAL: CPT | Mod: 59

## 2021-07-29 RX ORDER — METOPROLOL TARTRATE 50 MG
50 TABLET ORAL DAILY
Refills: 0 | Status: DISCONTINUED | OUTPATIENT
Start: 2021-07-29 | End: 2021-07-30

## 2021-07-29 RX ORDER — IBUPROFEN 200 MG
600 TABLET ORAL EVERY 6 HOURS
Refills: 0 | Status: DISCONTINUED | OUTPATIENT
Start: 2021-07-29 | End: 2021-07-30

## 2021-07-29 RX ORDER — ZOLPIDEM TARTRATE 10 MG/1
1 TABLET ORAL
Qty: 0 | Refills: 0 | DISCHARGE

## 2021-07-29 RX ORDER — OXYCODONE HYDROCHLORIDE 5 MG/1
5 TABLET ORAL ONCE
Refills: 0 | Status: DISCONTINUED | OUTPATIENT
Start: 2021-07-29 | End: 2021-07-29

## 2021-07-29 RX ORDER — AMLODIPINE BESYLATE 2.5 MG/1
1 TABLET ORAL
Qty: 0 | Refills: 0 | DISCHARGE

## 2021-07-29 RX ORDER — LIDOCAINE HCL 20 MG/ML
0.2 VIAL (ML) INJECTION ONCE
Refills: 0 | Status: DISCONTINUED | OUTPATIENT
Start: 2021-07-29 | End: 2021-07-29

## 2021-07-29 RX ORDER — ZOLPIDEM TARTRATE 10 MG/1
5 TABLET ORAL AT BEDTIME
Refills: 0 | Status: DISCONTINUED | OUTPATIENT
Start: 2021-07-29 | End: 2021-07-30

## 2021-07-29 RX ORDER — SIMETHICONE 80 MG/1
80 TABLET, CHEWABLE ORAL EVERY 6 HOURS
Refills: 0 | Status: DISCONTINUED | OUTPATIENT
Start: 2021-07-29 | End: 2021-07-30

## 2021-07-29 RX ORDER — SODIUM CHLORIDE 9 MG/ML
1000 INJECTION, SOLUTION INTRAVENOUS
Refills: 0 | Status: DISCONTINUED | OUTPATIENT
Start: 2021-07-29 | End: 2021-07-30

## 2021-07-29 RX ORDER — ACETAMINOPHEN 500 MG
975 TABLET ORAL ONCE
Refills: 0 | Status: COMPLETED | OUTPATIENT
Start: 2021-07-29 | End: 2021-07-29

## 2021-07-29 RX ORDER — FAMOTIDINE 10 MG/ML
20 INJECTION INTRAVENOUS ONCE
Refills: 0 | Status: COMPLETED | OUTPATIENT
Start: 2021-07-29 | End: 2021-07-29

## 2021-07-29 RX ORDER — CELECOXIB 200 MG/1
200 CAPSULE ORAL ONCE
Refills: 0 | Status: COMPLETED | OUTPATIENT
Start: 2021-07-29 | End: 2021-07-29

## 2021-07-29 RX ORDER — OMEPRAZOLE 10 MG/1
1 CAPSULE, DELAYED RELEASE ORAL
Qty: 0 | Refills: 0 | DISCHARGE

## 2021-07-29 RX ORDER — CEFOTETAN DISODIUM 1 G
2 VIAL (EA) INJECTION ONCE
Refills: 0 | Status: COMPLETED | OUTPATIENT
Start: 2021-07-29 | End: 2021-07-29

## 2021-07-29 RX ORDER — LEVOTHYROXINE SODIUM 125 MCG
25 TABLET ORAL DAILY
Refills: 0 | Status: DISCONTINUED | OUTPATIENT
Start: 2021-07-29 | End: 2021-07-30

## 2021-07-29 RX ORDER — ONDANSETRON 8 MG/1
8 TABLET, FILM COATED ORAL EVERY 8 HOURS
Refills: 0 | Status: DISCONTINUED | OUTPATIENT
Start: 2021-07-29 | End: 2021-07-30

## 2021-07-29 RX ORDER — OXYCODONE HYDROCHLORIDE 5 MG/1
2.5 TABLET ORAL EVERY 4 HOURS
Refills: 0 | Status: DISCONTINUED | OUTPATIENT
Start: 2021-07-29 | End: 2021-07-30

## 2021-07-29 RX ORDER — HYDROMORPHONE HYDROCHLORIDE 2 MG/ML
0.25 INJECTION INTRAMUSCULAR; INTRAVENOUS; SUBCUTANEOUS
Refills: 0 | Status: DISCONTINUED | OUTPATIENT
Start: 2021-07-29 | End: 2021-07-29

## 2021-07-29 RX ORDER — PANTOPRAZOLE SODIUM 20 MG/1
40 TABLET, DELAYED RELEASE ORAL
Refills: 0 | Status: DISCONTINUED | OUTPATIENT
Start: 2021-07-29 | End: 2021-07-30

## 2021-07-29 RX ORDER — OXYCODONE HYDROCHLORIDE 5 MG/1
5 TABLET ORAL EVERY 4 HOURS
Refills: 0 | Status: DISCONTINUED | OUTPATIENT
Start: 2021-07-29 | End: 2021-07-30

## 2021-07-29 RX ORDER — ACETAMINOPHEN 500 MG
650 TABLET ORAL EVERY 6 HOURS
Refills: 0 | Status: DISCONTINUED | OUTPATIENT
Start: 2021-07-29 | End: 2021-07-30

## 2021-07-29 RX ORDER — LEVOTHYROXINE SODIUM 125 MCG
1 TABLET ORAL
Qty: 0 | Refills: 0 | DISCHARGE

## 2021-07-29 RX ORDER — SODIUM CHLORIDE 9 MG/ML
3 INJECTION INTRAMUSCULAR; INTRAVENOUS; SUBCUTANEOUS EVERY 8 HOURS
Refills: 0 | Status: DISCONTINUED | OUTPATIENT
Start: 2021-07-29 | End: 2021-07-29

## 2021-07-29 RX ORDER — POLYETHYLENE GLYCOL 3350 17 G/17G
17 POWDER, FOR SOLUTION ORAL DAILY
Refills: 0 | Status: DISCONTINUED | OUTPATIENT
Start: 2021-07-29 | End: 2021-07-30

## 2021-07-29 RX ORDER — METOPROLOL TARTRATE 50 MG
1 TABLET ORAL
Qty: 0 | Refills: 0 | DISCHARGE

## 2021-07-29 RX ORDER — FENTANYL CITRATE 50 UG/ML
25 INJECTION INTRAVENOUS
Refills: 0 | Status: DISCONTINUED | OUTPATIENT
Start: 2021-07-29 | End: 2021-07-29

## 2021-07-29 RX ORDER — ONDANSETRON 8 MG/1
4 TABLET, FILM COATED ORAL ONCE
Refills: 0 | Status: DISCONTINUED | OUTPATIENT
Start: 2021-07-29 | End: 2021-07-29

## 2021-07-29 RX ORDER — SIMVASTATIN 20 MG/1
1 TABLET, FILM COATED ORAL
Qty: 0 | Refills: 0 | DISCHARGE

## 2021-07-29 RX ADMIN — CELECOXIB 200 MILLIGRAM(S): 200 CAPSULE ORAL at 08:12

## 2021-07-29 RX ADMIN — HYDROMORPHONE HYDROCHLORIDE 0.25 MILLIGRAM(S): 2 INJECTION INTRAMUSCULAR; INTRAVENOUS; SUBCUTANEOUS at 13:30

## 2021-07-29 RX ADMIN — Medication 100 GRAM(S): at 08:00

## 2021-07-29 RX ADMIN — FAMOTIDINE 20 MILLIGRAM(S): 10 INJECTION INTRAVENOUS at 08:12

## 2021-07-29 RX ADMIN — FENTANYL CITRATE 25 MICROGRAM(S): 50 INJECTION INTRAVENOUS at 13:15

## 2021-07-29 RX ADMIN — FENTANYL CITRATE 25 MICROGRAM(S): 50 INJECTION INTRAVENOUS at 12:45

## 2021-07-29 RX ADMIN — Medication 975 MILLIGRAM(S): at 08:13

## 2021-07-29 RX ADMIN — SIMETHICONE 80 MILLIGRAM(S): 80 TABLET, CHEWABLE ORAL at 17:46

## 2021-07-29 RX ADMIN — Medication 650 MILLIGRAM(S): at 21:23

## 2021-07-29 RX ADMIN — HYDROMORPHONE HYDROCHLORIDE 0.25 MILLIGRAM(S): 2 INJECTION INTRAMUSCULAR; INTRAVENOUS; SUBCUTANEOUS at 13:45

## 2021-07-29 RX ADMIN — ZOLPIDEM TARTRATE 5 MILLIGRAM(S): 10 TABLET ORAL at 21:23

## 2021-07-29 RX ADMIN — SODIUM CHLORIDE 75 MILLILITER(S): 9 INJECTION, SOLUTION INTRAVENOUS at 12:44

## 2021-07-29 RX ADMIN — Medication 650 MILLIGRAM(S): at 22:00

## 2021-07-29 NOTE — BRIEF OPERATIVE NOTE - OPERATION/FINDINGS
Stage 2 uterovaginal prolapse. Small mobile uterus. Unable to visualize bilateral fallopian tubes or ovaries.   On cystoscopy, normal bladder mucosa. No suture, mesh, injury, or foreign body noted. Bilateral ureteral orifices identified and effluxing clear yellow urine with uterosacral sutures on tension and off tension. Repeat cystoscopy after uterosacral sutures tied down once again showed bilateral efflux.   On rectal exam after procedure, no suture or injury.    Frozen section pathology: benign, endometrial polyp

## 2021-07-29 NOTE — BRIEF OPERATIVE NOTE - NSICDXBRIEFPOSTOP_GEN_ALL_CORE_FT
POST-OP DIAGNOSIS:  Incomplete uterovaginal prolapse 29-Jul-2021 12:56:05  Erum Mendoza  Midline cystocele 29-Jul-2021 12:55:57  Erum Mendoza  Rectocele 29-Jul-2021 12:55:52  Erum Mendoza  Urinary, incontinence, stress female 29-Jul-2021 12:55:49  Erum Mendoza

## 2021-07-29 NOTE — BRIEF OPERATIVE NOTE - NSICDXBRIEFPROCEDURE_GEN_ALL_CORE_FT
PROCEDURES:  Vaginal hysterectomy with uterosacral vault suspension and cystoscopy 29-Jul-2021 12:54:40  Erum Mendoza  Anteroposterior colporrhaphy with enterocele repair 29-Jul-2021 12:54:53  Erum Mendoza  Insertion, suburethral sling, vaginal approach, single incision 29-Jul-2021 12:55:05 Altis mini sling Erum Mendoza

## 2021-07-29 NOTE — CHART NOTE - NSCHARTNOTEFT_GEN_A_CORE
R1 OBGYN POST-OP CHECK    S: Patient seen and evaluated at bedside. Pt sleeping, easily arousable. Patient reports pain controlled with analgesia but would prefer to avoid Motrin because NSAIDs upsets her stomach. Pt denies N/V, SOB, CP, palpitations, fever/chills. Tolerating clears. Not OOB yet. Passing flatus.    O:   T(C): 36.3 (07-29-21 @ 14:30), Max: 36.3 (07-29-21 @ 12:13)  HR: 74 (07-29-21 @ 15:00) (65 - 74)  BP: 126/63 (07-29-21 @ 15:00) (106/59 - 126/63)  RR: 17 (07-29-21 @ 15:00) (16 - 17)  SpO2: 98% (07-29-21 @ 15:00) (95% - 100%)  Wt(kg): --  I&O's Summary    29 Jul 2021 07:01  -  29 Jul 2021 15:04  --------------------------------------------------------  IN: 525 mL / OUT: 235 mL / NET: 290 mL      Gen: Resting comfortably in bed, NAD  CV: S1S2, RRR  Lungs: CTA B/L  Abd: soft, non-tender, non-distended, occasional BS x 4 quadrants.    : Vag packing x1 in place, minimal blood seen on packing, no blood on menstrual pad.  Ext: SCD's in place and functional, non-tender b/l, no edema      A/P: 76y Female s/p TVH, USS, MUS, A/P Repair for uterovaginal prolapse and FATEMEH. Patient is stable and meeting post op milestones.    Neuro: PO Analgesia PRN  CV: Hemodynamically stable.  Monitor VS and hold home Metoprolol if BP <110/60 or HR <60. CBC in AM.   Pulm: Saturating well on room air.  Encourage OOB and incentive spirometer use.   GI: Advance to regular diet. Anti-emetics PRN.  : [place]rosc[place] to gravity. TOV in AM  FEN: Electrolytes: LR@75cc/hr.   Heme: DVT ppx w/ SCD's while in bed. Early ambulation, initially with assistance then as tolerated.   ID: Afebrile  Endo: Hypothyroidism, c/w home Levothyroxine.  Dispo: To floor    KRISTAL Paulino, PGY1 R1 OBGYN POST-OP CHECK    S: Patient seen and evaluated at bedside. Pt sleeping, easily arousable. Patient reports pain controlled with analgesia but would prefer to avoid Motrin because NSAIDs upsets her stomach. Pt denies N/V, SOB, CP, palpitations, fever/chills. Tolerating clears. Not OOB yet. Passing flatus.    O:   T(C): 36.3 (07-29-21 @ 14:30), Max: 36.3 (07-29-21 @ 12:13)  HR: 74 (07-29-21 @ 15:00) (65 - 74)  BP: 126/63 (07-29-21 @ 15:00) (106/59 - 126/63)  RR: 17 (07-29-21 @ 15:00) (16 - 17)  SpO2: 98% (07-29-21 @ 15:00) (95% - 100%)  Wt(kg): --  I&O's Summary    29 Jul 2021 07:01  -  29 Jul 2021 15:04  --------------------------------------------------------  IN: 525 mL / OUT: 235 mL / NET: 290 mL      Gen: Resting comfortably in bed, NAD  CV: S1S2, RRR  Lungs: CTA B/L  Abd: soft, non-tender, non-distended, occasional BS x 4 quadrants.    : Vag packing x1 in place, minimal blood seen on packing, no blood on menstrual pad.  Ext: SCD's in place and functional, non-tender b/l, no edema      A/P: 76y Female s/p TVH, USS, MUS, A/P Repair for uterovaginal prolapse and FATEMEH. Patient is stable and meeting post op milestones.    Neuro: PO Analgesia PRN  CV: Hemodynamically stable.  Monitor VS and hold home Metoprolol if BP <110/60 or HR <60. CBC in AM.   Pulm: Saturating well on room air.  Encourage OOB and incentive spirometer use.   GI: Advance to regular diet. Anti-emetics PRN, Simethicone and Miralax bowel regimen, Protonix in AM.  : [place]rosco to gravity. TOV in AM  FEN: Electrolytes: LR@75cc/hr.   Heme: DVT ppx w/ SCD's while in bed. Early ambulation, initially with assistance then as tolerated.   ID: Afebrile  Endo: Hypothyroidism, c/w home Levothyroxine.  Dispo: To floor    KRISTAL Paulino, PGY1

## 2021-07-29 NOTE — BRIEF OPERATIVE NOTE - NSICDXBRIEFPREOP_GEN_ALL_CORE_FT
PRE-OP DIAGNOSIS:  Incomplete uterovaginal prolapse 29-Jul-2021 12:55:22  Erum Mendoza  Midline cystocele 29-Jul-2021 12:55:28  Erum Mendoza  Rectocele 29-Jul-2021 12:55:34  Erum Mendoza  Urinary, incontinence, stress female 29-Jul-2021 12:55:42  Erum Mendoza

## 2021-07-30 ENCOUNTER — TRANSCRIPTION ENCOUNTER (OUTPATIENT)
Age: 76
End: 2021-07-30

## 2021-07-30 VITALS — TEMPERATURE: 98 F | OXYGEN SATURATION: 97 % | RESPIRATION RATE: 18 BRPM

## 2021-07-30 LAB
HCT VFR BLD CALC: 35.7 % — SIGNIFICANT CHANGE UP (ref 34.5–45)
HGB BLD-MCNC: 11.8 G/DL — SIGNIFICANT CHANGE UP (ref 11.5–15.5)
MCHC RBC-ENTMCNC: 30.4 PG — SIGNIFICANT CHANGE UP (ref 27–34)
MCHC RBC-ENTMCNC: 33.1 GM/DL — SIGNIFICANT CHANGE UP (ref 32–36)
MCV RBC AUTO: 92 FL — SIGNIFICANT CHANGE UP (ref 80–100)
NRBC # BLD: 0 /100 WBCS — SIGNIFICANT CHANGE UP (ref 0–0)
PLATELET # BLD AUTO: 218 K/UL — SIGNIFICANT CHANGE UP (ref 150–400)
RBC # BLD: 3.88 M/UL — SIGNIFICANT CHANGE UP (ref 3.8–5.2)
RBC # FLD: 12.5 % — SIGNIFICANT CHANGE UP (ref 10.3–14.5)
WBC # BLD: 9.17 K/UL — SIGNIFICANT CHANGE UP (ref 3.8–10.5)
WBC # FLD AUTO: 9.17 K/UL — SIGNIFICANT CHANGE UP (ref 3.8–10.5)

## 2021-07-30 PROCEDURE — 82962 GLUCOSE BLOOD TEST: CPT

## 2021-07-30 PROCEDURE — C1889: CPT

## 2021-07-30 PROCEDURE — 88307 TISSUE EXAM BY PATHOLOGIST: CPT

## 2021-07-30 PROCEDURE — C9399: CPT

## 2021-07-30 PROCEDURE — 85027 COMPLETE CBC AUTOMATED: CPT

## 2021-07-30 PROCEDURE — 88331 PATH CONSLTJ SURG 1 BLK 1SPC: CPT

## 2021-07-30 RX ORDER — OXYCODONE HYDROCHLORIDE 5 MG/1
1 TABLET ORAL
Qty: 5 | Refills: 0
Start: 2021-07-30

## 2021-07-30 RX ORDER — ACETAMINOPHEN 500 MG
2 TABLET ORAL
Qty: 0 | Refills: 0 | DISCHARGE
Start: 2021-07-30

## 2021-07-30 RX ORDER — SODIUM CHLORIDE 9 MG/ML
3 INJECTION INTRAMUSCULAR; INTRAVENOUS; SUBCUTANEOUS EVERY 8 HOURS
Refills: 0 | Status: DISCONTINUED | OUTPATIENT
Start: 2021-07-30 | End: 2021-07-30

## 2021-07-30 RX ORDER — ASPIRIN/CALCIUM CARB/MAGNESIUM 324 MG
0 TABLET ORAL
Qty: 0 | Refills: 0 | DISCHARGE

## 2021-07-30 RX ORDER — IBUPROFEN 200 MG
1 TABLET ORAL
Qty: 0 | Refills: 0 | DISCHARGE
Start: 2021-07-30

## 2021-07-30 RX ADMIN — Medication 25 MICROGRAM(S): at 09:55

## 2021-07-30 RX ADMIN — Medication 650 MILLIGRAM(S): at 15:54

## 2021-07-30 RX ADMIN — SIMETHICONE 80 MILLIGRAM(S): 80 TABLET, CHEWABLE ORAL at 15:24

## 2021-07-30 RX ADMIN — Medication 650 MILLIGRAM(S): at 10:25

## 2021-07-30 RX ADMIN — PANTOPRAZOLE SODIUM 40 MILLIGRAM(S): 20 TABLET, DELAYED RELEASE ORAL at 06:25

## 2021-07-30 RX ADMIN — Medication 650 MILLIGRAM(S): at 03:45

## 2021-07-30 RX ADMIN — Medication 650 MILLIGRAM(S): at 03:09

## 2021-07-30 RX ADMIN — POLYETHYLENE GLYCOL 3350 17 GRAM(S): 17 POWDER, FOR SOLUTION ORAL at 15:24

## 2021-07-30 RX ADMIN — Medication 650 MILLIGRAM(S): at 09:55

## 2021-07-30 RX ADMIN — Medication 650 MILLIGRAM(S): at 15:24

## 2021-07-30 NOTE — DISCHARGE NOTE PROVIDER - HOSPITAL COURSE
Pt presented to the hospital for scheduled TVH, USS, MUS, A/P repair, cysto. Pt underwent uncomplicated procedure. On POD#1 pt (passed/failed) her TOV & was discharged home in stable condition meeting appropriate post-operative milestones. Pt presented to the hospital for scheduled TVH, USS, MUS, A/P repair, cysto. Pt underwent uncomplicated procedure. Please see operative note for details. On POD#1 patient passed her TOV & was discharged home in stable condition meeting appropriate post-operative milestones.

## 2021-07-30 NOTE — DISCHARGE NOTE NURSING/CASE MANAGEMENT/SOCIAL WORK - PATIENT PORTAL LINK FT
You can access the FollowMyHealth Patient Portal offered by [facility] by registering at the following website: [email or web address]. By joining [employer]’s FollowMyHealth portal, you will also be able to view your health information using other applications (apps) compatible with our system.

## 2021-07-30 NOTE — DISCHARGE NOTE PROVIDER - CARE PROVIDER_API CALL
Jarvis Steel)  Female Pelvic MedReconst Surg; Obstetrics and Gynecology  [address], [address]  [place] [address]  Phone: [phone]  Fax: [phone]  Scheduled Appointment: 08/16/2021 01:00 PM

## 2021-07-30 NOTE — DISCHARGE NOTE PROVIDER - NSDCMRMEDTOKEN_GEN_ALL_CORE_FT
acetaminophen 325 mg oral tablet: 2 tab(s) orally every 6 hours  Ambien 5 mg oral tablet: 1 tab(s) orally once a day (at bedtime)  amLODIPine 5 mg oral tablet: 1 tab(s) orally once a day  ibuprofen 600 mg oral tablet: 1 tab(s) orally every 6 hours  levothyroxine 25 mcg (0.025 mg) oral capsule: 1 cap(s) orally once a day  Metoprolol Succinate ER 50 mg oral tablet, extended release: 1 tab(s) orally once a day  omeprazole 20 mg oral delayed release capsule: 1 cap(s) orally once a day  simvastatin 10 mg oral tablet: 1 tab(s) orally once a day (at bedtime)   acetaminophen 325 mg oral tablet: 2 tab(s) orally every 6 hours  Ambien 5 mg oral tablet: 1 tab(s) orally once a day (at bedtime)  amLODIPine 5 mg oral tablet: 1 tab(s) orally once a day  ibuprofen 600 mg oral tablet: 1 tab(s) orally every 6 hours  levothyroxine 25 mcg (0.025 mg) oral capsule: 1 cap(s) orally once a day  Metoprolol Succinate ER 50 mg oral tablet, extended release: 1 tab(s) orally once a day  omeprazole 20 mg oral delayed release capsule: 1 cap(s) orally once a day  oxyCODONE 5 mg oral tablet: 1 tab(s) orally every 8 hours MDD:2 tabs   simvastatin 10 mg oral tablet: 1 tab(s) orally once a day (at bedtime)

## 2021-07-30 NOTE — PROGRESS NOTE ADULT - SUBJECTIVE AND OBJECTIVE BOX
Urogynecology Progress Note    Patient evaluated at bedside.  No events overnight.  Pain well controlled.  Denies chest pain, shortness of breath, fevers, chills.    acetaminophen   Tablet .. 650 milliGRAM(s) Oral  ibuprofen  Tablet. 600 milliGRAM(s) Oral  lactated ringers. 1000 milliLiter(s) IV Continuous  levothyroxine 25 MICROGram(s) Oral  metoprolol succinate ER 50 milliGRAM(s) Oral  ondansetron    Tablet 8 milliGRAM(s) Oral PRN  oxyCODONE    IR 2.5 milliGRAM(s) Oral PRN  oxyCODONE    IR 5 milliGRAM(s) Oral PRN  pantoprazole    Tablet 40 milliGRAM(s) Oral  polyethylene glycol 3350 17 Gram(s) Oral  simethicone 80 milliGRAM(s) Chew  zolpidem 5 milliGRAM(s) Oral PRN      Exam  T(C): 36.7 (07-30 @ 00:27), Max: 36.7 (07-29 @ 21:03)  HR: 72 (07-30 @ 00:27) (72 - 72)  BP: 119/75 (07-30 @ 00:27) (119/75 - 119/75)  RR: 18 (07-30 @ 00:27) (18 - 18)  SpO2: 95% (07-30 @ 00:27) (95% - 98%)    General: NAD  CV: RRR  Pulm: CTAB  Abd: soft, nontender nondistended, +bowel sounds  : no CVA tenderness  Pelvic: scant blood on pad, forrester in place, vaginal packing x1 in place  Ext: nontender bilaterally      07-29 @ 07:01  -  07-30 @ 05:07  --------------------------------------------------------  IN: 525 mL / OUT: 1435 mL / NET: -910 mL        UOP = 150cc/hour    Preoperative Hg/Hct: 12.2/37.4    Preoperative Cr: 0.72       Urogynecology Progress Note    Patient evaluated at bedside.  No events overnight.  Pain well controlled. Pt tolerating regular diet, has not yet ambulated. Pt passing gas, has not had BM yet. Denies chest pain, shortness of breath, fevers, chills.    acetaminophen   Tablet .. 650 milliGRAM(s) Oral  ibuprofen  Tablet. 600 milliGRAM(s) Oral  lactated ringers. 1000 milliLiter(s) IV Continuous  levothyroxine 25 MICROGram(s) Oral  metoprolol succinate ER 50 milliGRAM(s) Oral  ondansetron    Tablet 8 milliGRAM(s) Oral PRN  oxyCODONE    IR 2.5 milliGRAM(s) Oral PRN  oxyCODONE    IR 5 milliGRAM(s) Oral PRN  pantoprazole    Tablet 40 milliGRAM(s) Oral  polyethylene glycol 3350 17 Gram(s) Oral  simethicone 80 milliGRAM(s) Chew  zolpidem 5 milliGRAM(s) Oral PRN      Exam  T(C): 36.7 (07-30 @ 00:27), Max: 36.7 (07-29 @ 21:03)  HR: 72 (07-30 @ 00:27) (72 - 72)  BP: 119/75 (07-30 @ 00:27) (119/75 - 119/75)  RR: 18 (07-30 @ 00:27) (18 - 18)  SpO2: 95% (07-30 @ 00:27) (95% - 98%)    General: NAD  CV: RRR  Pulm: CTAB  Abd: soft, nontender nondistended, +bowel sounds  : no CVA tenderness  Pelvic: scant blood on pad, forrester in place, vaginal packing x1 in place  Ext: nontender bilaterally      07-29 @ 07:01  -  07-30 @ 05:07  --------------------------------------------------------  IN: 525 mL / OUT: 1435 mL / NET: -910 mL        UOP = 150cc/hour    Preoperative Hg/Hct: 12.2/37.4    Preoperative Cr: 0.72       Urogynecology Progress Note    Patient evaluated at bedside. No events overnight. Pain well controlled. Pt tolerating regular diet, has not yet tried to ambulate. Denies lightheadedness, dizziness. Pt passing gas. Denies chest pain, shortness of breath, fevers, chills.     acetaminophen   Tablet .. 650 milliGRAM(s) Oral  ibuprofen  Tablet. 600 milliGRAM(s) Oral  lactated ringers. 1000 milliLiter(s) IV Continuous  levothyroxine 25 MICROGram(s) Oral  metoprolol succinate ER 50 milliGRAM(s) Oral  ondansetron    Tablet 8 milliGRAM(s) Oral PRN  oxyCODONE    IR 2.5 milliGRAM(s) Oral PRN  oxyCODONE    IR 5 milliGRAM(s) Oral PRN  pantoprazole    Tablet 40 milliGRAM(s) Oral  polyethylene glycol 3350 17 Gram(s) Oral  simethicone 80 milliGRAM(s) Chew  zolpidem 5 milliGRAM(s) Oral PRN      Exam  T(C): 36.7 (07-30 @ 00:27), Max: 36.7 (07-29 @ 21:03)  HR: 72 (07-30 @ 00:27) (72 - 72)  BP: 119/75 (07-30 @ 00:27) (119/75 - 119/75)  RR: 18 (07-30 @ 00:27) (18 - 18)  SpO2: 95% (07-30 @ 00:27) (95% - 98%)    General: NAD  CV: RRR  Pulm: CTAB  Abd: soft, nontender nondistended, +bowel sounds  : no CVA tenderness  Pelvic: scant blood on pad, forrester in place and draining clear yellow urine, vaginal packing x1 in place (removed by Dr Mendoza)  Ext: nontender bilaterally, no edema      07-29 @ 07:01  -  07-30 @ 05:07  --------------------------------------------------------  IN: 525 mL / OUT: 1435 mL / NET: -910 mL        UOP = 150cc/hour    Preoperative Hg/Hct: 12.2/37.4    Preoperative Cr: 0.72

## 2021-07-30 NOTE — DISCHARGE NOTE PROVIDER - NSDCFUADDINST_GEN_ALL_CORE_FT
Return to your regular way of eating.  Resume normal activity as tolerated, but no heavy lifting or strenuous activity for 6 weeks.  No driving while on narcotic pain medication.  Complete vaginal rest, no tampons, no douching, no tub bathing, no sexual activities for 6 weeks unless otherwise instructed by your doctor.  Call your doctor with any signs and symptoms of infection such as fever, chills, nausea or vomiting.  Call your doctor with redness or swelling at the incision site, fluid leakage or wound separation.  Call your doctor if you're unable to tolerate food or have difficulty urinating.  Call your doctor if you have pain that is not relieved by your prescribed medications.  Notify your doctor with any other concerns.  Follow up with Dr. Steel in 2 weeks.   Resume normal activity as tolerated, but no heavy lifting or strenuous activity for 6 weeks.  No driving while on narcotic pain medication.  Complete vaginal rest, no tampons, no douching, no tub bathing, no sexual activities for 8 weeks unless otherwise instructed by your doctor.  Call your doctor with any signs and symptoms of infection such as fever, chills, nausea or vomiting.  Call your doctor with redness or swelling at the incision site, fluid leakage or wound separation.  Call your doctor if you're unable to tolerate food or have difficulty urinating.  Call your doctor if you have pain that is not relieved by your prescribed medications.  Notify your doctor with any other concerns.  Follow up with Dr. Steel in 2 weeks.

## 2021-07-30 NOTE — PROGRESS NOTE ADULT - ASSESSMENT
Pt is a 77yo POD#1 from vag hyst, USS, A/P repair, mini sling, cysto doing well postoperatively.     Neuro: Continue tylenol, motrin, & oxycodone for pain control, continue home ambien  CV: Hemodynamically stable, continue home metoprolol, f/u AM CBC  Pulm: Oxygenating well on room air, continue IS, continue albuterol PRN  GI: Tolerating regular diet, continue pepcid, continue simethicone, continue miralax, continue zofran PRN  Endo: continue home synthroid  FEN/: vaginal packing removed, Adequate UOP, plan for trial of void this AM  Heme: DVT prophylaxis with SCDs and early ambulation  Dispo: discharge planning in progress    Janessa Magdaleno,PGY2 Pt is a 75yo POD#1 from vag hyst, USS, APE repair, mini sling, cysto doing well postoperatively.     Neuro: Continue tylenol, motrin, & oxycodone for pain control, continue home ambien  CV: Hemodynamically stable, continue home metoprolol, f/u AM CBC  Pulm: Oxygenating well on room air, continue IS, continue albuterol PRN  GI: Tolerating regular diet, continue pepcid, continue simethicone, continue miralax, continue zofran PRN  Endo: continue home synthroid  FEN/: vaginal packing removed, Adequate UOP, plan for trial of void this AM  Heme: DVT prophylaxis with SCDs and early ambulation  Dispo: discharge planning in progress    Janessa Magdaleno,PGY2

## 2021-07-30 NOTE — DISCHARGE NOTE PROVIDER - NSDCCPCAREPLAN_GEN_ALL_CORE_FT
PRINCIPAL DISCHARGE DIAGNOSIS  Diagnosis: Incomplete uterovaginal prolapse  Assessment and Plan of Treatment:

## 2021-07-30 NOTE — DISCHARGE NOTE PROVIDER - NSDCCPTREATMENT_GEN_ALL_CORE_FT
PRINCIPAL PROCEDURE  Procedure: Vaginal hysterectomy with uterosacral vault suspension and cystoscopy  Findings and Treatment:       SECONDARY PROCEDURE  Procedure: Insertion, suburethral sling, vaginal approach, single incision  Findings and Treatment: Altis mini sling    Procedure: Anteroposterior colporrhaphy with enterocele repair  Findings and Treatment:

## 2021-08-02 ENCOUNTER — NON-APPOINTMENT (OUTPATIENT)
Age: 76
End: 2021-08-02

## 2021-08-05 LAB — SURGICAL PATHOLOGY STUDY: SIGNIFICANT CHANGE UP

## 2021-08-06 PROBLEM — N81.11 MIDLINE CYSTOCELE: Status: RESOLVED | Noted: 2020-04-27 | Resolved: 2021-08-06

## 2021-08-16 ENCOUNTER — APPOINTMENT (OUTPATIENT)
Dept: UROGYNECOLOGY | Facility: CLINIC | Age: 76
End: 2021-08-16
Payer: MEDICARE

## 2021-08-16 VITALS
TEMPERATURE: 98.6 F | WEIGHT: 117 LBS | HEIGHT: 61 IN | SYSTOLIC BLOOD PRESSURE: 140 MMHG | BODY MASS INDEX: 22.09 KG/M2 | DIASTOLIC BLOOD PRESSURE: 90 MMHG

## 2021-08-16 PROBLEM — N39.46 MIXED INCONTINENCE: Chronic | Status: ACTIVE | Noted: 2021-07-15

## 2021-08-16 PROCEDURE — 99024 POSTOP FOLLOW-UP VISIT: CPT

## 2021-08-16 NOTE — OBJECTIVE
[Post Void Residual ____ ml] : Post Void Residual was [unfilled] ml [Soft and Nontender] : soft and nontender [Clean, Dry, Intact] : Clean, Dry, Intact [Good Support] : Good support [Healing well] : healing well [No Masses or Tenderness] : no masses or tenderness [Voiding Trial] : No voiding trial was performed [FreeTextEntry3] : No mesh tenderness, no mesh exposure

## 2021-08-16 NOTE — SUBJECTIVE
[FreeTextEntry7] : Pain controlled. Uses Motrin/Tylenol PRN.  [FreeTextEntry6] : Good appetite.  [FreeTextEntry5] : Denies dysuria, urinary incontinence, urinary frequency/urgency.  [FreeTextEntry4] : BM regular. Some discomfort in the rectal area where stitch is.  [FreeTextEntry3] : Reports occasional left left pain, but has been ambulating without difficulty.  [FreeTextEntry2] : Sleeping without difficulty.

## 2021-08-16 NOTE — DISCUSSION/SUMMARY
[FreeTextEntry1] : Meme is a 77 yo s/p TVH, USLS, A/P/E repair, mini sling, cystoscopy on 7/29/2021. She is doing well. Denies vaginal bleeding, urinary/bowel issues. Encouraged continue use of stool softeners. Discussed sutures eventually dissolving. Pathology report discussed, benign. Discussed use of Replens/Zinc oxide PRN. Discussed waiting to use vaginal estrogen until next follow up appointment. RTO in 4 weeks or sooner if issues arise. All questions answered.

## 2021-09-07 ENCOUNTER — NON-APPOINTMENT (OUTPATIENT)
Age: 76
End: 2021-09-07

## 2021-09-15 ENCOUNTER — APPOINTMENT (OUTPATIENT)
Dept: UROGYNECOLOGY | Facility: CLINIC | Age: 76
End: 2021-09-15
Payer: MEDICARE

## 2021-09-15 VITALS — TEMPERATURE: 97.3 F

## 2021-09-15 DIAGNOSIS — Z98.890 OTHER SPECIFIED POSTPROCEDURAL STATES: ICD-10-CM

## 2021-09-15 PROCEDURE — 99024 POSTOP FOLLOW-UP VISIT: CPT

## 2021-11-10 ENCOUNTER — APPOINTMENT (OUTPATIENT)
Dept: UROGYNECOLOGY | Facility: CLINIC | Age: 76
End: 2021-11-10
Payer: MEDICARE

## 2021-11-10 VITALS — TEMPERATURE: 97.1 F

## 2021-11-10 DIAGNOSIS — N81.2 INCOMPLETE UTEROVAGINAL PROLAPSE: ICD-10-CM

## 2021-11-10 DIAGNOSIS — N39.3 STRESS INCONTINENCE (FEMALE) (MALE): ICD-10-CM

## 2021-11-10 PROCEDURE — 99213 OFFICE O/P EST LOW 20 MIN: CPT

## 2021-11-10 NOTE — PHYSICAL EXAM
[No Acute Distress] : in no acute distress [Well developed] : well developed [Well Nourished] : ~L well nourished [Good Hygeine] : demonstrates good hygeine [Oriented x3] : oriented to person, place, and time [Normal Memory] : ~T memory was ~L unimpaired [Normal Mood/Affect] : mood and affect are normal [None] : no CVA tenderness [Warm and Dry] : was warm and dry to touch [Normal Gait] : gait was normal [Vulvar Atrophy] : vulvar atrophy [Labia Majora] : were normal [Normal] : was normal [Labia Minora] : were normal [Atrophy] : atrophy [No Bleeding] : there was no active vaginal bleeding [Anxiety] : patient is not anxious [Tenderness] : ~T no ~M abdominal tenderness observed [Distended] : not distended [FreeTextEntry4] : No Mesh Exposure.

## 2021-11-10 NOTE — HISTORY OF PRESENT ILLNESS
[FreeTextEntry1] : Patient with known hx of POP, FATEMEH and atrophy s/p TVH, USLS, A/P/E repair, mini sling, cystoscopy on 7/29/2021 presents to office for follow up. She states since the surgery she is doing very well. Denies any pelvic pain, pressure or vaginal bleeding. She is urinating well. Denies incomplete emptying, leakage of urine, dysuria, frequency or urgency. She is moving her bowels with no complaints. She is using the vaginal estrogen as RX.

## 2021-11-10 NOTE — DISCUSSION/SUMMARY
[FreeTextEntry1] : 1. Hx of POP, FATEMEH s/p TVH, USLS, A/P/E repair, mini sling, cystoscopy on 7/29/2021\par - Patient doing well post operatively, she is happy with results\par - Continue vaginal estrogen as RX\par - Will RTO for follow up in 12 months or sooner if needed\par \par Patient agrees to call office with any questions or concerns, verbalized understanding.

## 2022-08-22 NOTE — PRE-OP CHECKLIST - WARM FLUIDS/WARM BLANKETS
no Libtayo Counseling- I discussed with the patient the risks of Libtayo including but not limited to nausea, vomiting, diarrhea, and bone or muscle pain.  The patient verbalized understanding of the proper use and possible adverse effects of Libtayo.  All of the patient's questions and concerns were addressed.
